# Patient Record
Sex: FEMALE | Race: WHITE | NOT HISPANIC OR LATINO | Employment: FULL TIME | URBAN - METROPOLITAN AREA
[De-identification: names, ages, dates, MRNs, and addresses within clinical notes are randomized per-mention and may not be internally consistent; named-entity substitution may affect disease eponyms.]

---

## 2017-01-09 ENCOUNTER — ALLSCRIPTS OFFICE VISIT (OUTPATIENT)
Dept: OTHER | Facility: OTHER | Age: 27
End: 2017-01-09

## 2017-03-08 ENCOUNTER — ALLSCRIPTS OFFICE VISIT (OUTPATIENT)
Dept: OTHER | Facility: OTHER | Age: 27
End: 2017-03-08

## 2017-04-01 ENCOUNTER — TRANSCRIBE ORDERS (OUTPATIENT)
Dept: URGENT CARE | Age: 27
End: 2017-04-01

## 2017-04-01 ENCOUNTER — HOSPITAL ENCOUNTER (OUTPATIENT)
Dept: RADIOLOGY | Age: 27
Discharge: HOME | End: 2017-04-01
Payer: OTHER MISCELLANEOUS

## 2017-04-01 ENCOUNTER — APPOINTMENT (OUTPATIENT)
Dept: URGENT CARE | Age: 27
End: 2017-04-01
Payer: OTHER MISCELLANEOUS

## 2017-04-01 ENCOUNTER — HOSPITAL ENCOUNTER (OUTPATIENT)
Dept: RADIOLOGY | Facility: HOSPITAL | Age: 27
Discharge: HOME | End: 2017-04-01
Payer: OTHER MISCELLANEOUS

## 2017-04-01 DIAGNOSIS — R52 PAIN: Primary | ICD-10-CM

## 2017-04-01 DIAGNOSIS — R52 PAIN: ICD-10-CM

## 2017-04-01 PROCEDURE — 99283 EMERGENCY DEPT VISIT LOW MDM: CPT | Performed by: FAMILY MEDICINE

## 2017-04-01 PROCEDURE — G0382 LEV 3 HOSP TYPE B ED VISIT: HCPCS | Performed by: FAMILY MEDICINE

## 2017-04-01 PROCEDURE — 73630 X-RAY EXAM OF FOOT: CPT

## 2017-06-15 ENCOUNTER — ALLSCRIPTS OFFICE VISIT (OUTPATIENT)
Dept: OTHER | Facility: OTHER | Age: 27
End: 2017-06-15

## 2017-06-15 DIAGNOSIS — E55.9 VITAMIN D DEFICIENCY: ICD-10-CM

## 2017-12-06 ENCOUNTER — ALLSCRIPTS OFFICE VISIT (OUTPATIENT)
Dept: OTHER | Facility: OTHER | Age: 27
End: 2017-12-06

## 2017-12-12 ENCOUNTER — ALLSCRIPTS OFFICE VISIT (OUTPATIENT)
Dept: OTHER | Facility: OTHER | Age: 27
End: 2017-12-12

## 2017-12-16 NOTE — PROGRESS NOTES
Assessment  1  Hordeolum externum of left upper eyelid (373 11) (H00 014)   2  Need for influenza vaccination (V04 81) (Z23)    Plan  Hordeolum externum of left upper eyelid    · Neomycin-Polymyxin-Gramicidin 1 [id number]  025 Ophthalmic Solution(Neosporin); INSTILL 1-2 DROPS EVERY 4HOURS INTO AFFECTED EYE(S)  Need for influenza vaccination    · Fluzone Quadrivalent 0 5 ML Intramuscular Suspension Prefilled Syringe    Discussion/Summary    Warm compresses to the siteuse Baby Shampoo to clean the eyeliduse the antibiotic drops as directed for 5-7 daysthrow away old make up and do not use any make up until symptoms resolve  The patient was counseled regarding instructions for management  Chief Complaint  Stye left eye      History of Present Illness  HPI: Patient began with redness and tenderness in left upper eyelid  Hard, tender lump  Began 5 days ago, using OTC drops and warm compresses  Today states hard lump left eyelid and has some itching and the swelling has gone down today  No other c/odenies trauma, thinks that it is from use of old mascara      Review of Systems   Constitutional: No fever, no chills, feels well, no tiredness, no recent weight gain or loss  ENT: as noted in HPI  Cardiovascular: no complaints of slow or fast heart rate, no chest pain, no palpitations, no leg claudication or lower extremity edema  Respiratory: no complaints of shortness of breath, no wheezing, no dyspnea on exertion, no orthopnea or PND  Integumentary: as noted in HPI  Active Problems  1  Acute sinusitis (461 9) (J01 90)   2  ADHD, 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)   3  Anxiety (300 00) (F41 9)   4  Bacterial vaginosis (616 10,041 9) (N76 0,B96 89)   5  Community acquired pneumonia (5) (J18 9)   6  Depression (311) (F32 9)   7  Elevated glucose level (790 29) (R73 09)   8  Fatigue, unspecified type (780 79) (R53 83)   9  Headache (784 0) (R51)   10   Iron deficiency anemia, unspecified iron deficiency anemia type (280 9) (D50 9)   11  Post-concussion headache (339 20) (G44 309)   12  Tinea versicolor (111 0) (B36 0)   13  TMJ arthralgia (524 62) (M26 629)   14  Vitamin D deficiency (268 9) (E55 9)    Past Medical History  1  History of Chest pain on respiration (786 52) (R07 1)   2  History of Denial Of Any Significant Medical History   3  History of Fatigue (780 79) (R53 83)   4  History of acute otitis media (V12 49) (Z86 69)   5  History of candidiasis of mouth (V12 09) (Z86 19)   6  History of pregnancy (V13 29)   7  History of strain (V13 59) (Z87 39)   8  History of Pain in joint of left shoulder (719 41) (M25 512)   9  History of Paronychia of finger, unspecified laterality (681 02) (L03 019)   10  History of Presenile dementia (290 10) (F03 90)   11  History of Sebaceous cyst (706 2) (L72 3)   12  History of Tinea versicolor (111 0) (B36 0)    Family History  Father    1  Family history of Heart Disease   2  Family history of Pre-diabetes  Maternal Great Grandmother    3  Family history of colon cancer (V16 0) (Z80 0)  Family History    4  Family history of Heart Disease   5  Family history of Hypertension (V17 49)    Social History   · Current Smoker (305 1)   · Former smoker (O27 57) (C72 043)    Surgical History  1  History of Surgery Excision Lipoma    Current Meds   1  Adderall 10 MG Oral Tablet; TAKE 1/2 A TABLET TWICE DAILY; Therapy: (Recorded:07Nov2016) to Recorded   2  ALPRAZolam 0 25 MG Oral Tablet; 1 tab BID prn anxiety; Therapy: 54Hdj2665 to (Last Rx:12Apr2016) Ordered   3  Clotrimazole 1 % External Cream; APPLY 2-3 TIMES DAILY TO AFFECTED AREA(S); Therapy: 65IHR5567 to (Last Rx:15Jun2017)  Requested for: 37AZK8126 Ordered   4  Cymbalta 30 MG Oral Capsule Delayed Release Particles; TAKE 1 CAPSULE DAILY; Therapy: 03ITX7724 to (Evaluate:16Nov2016)  Requested for: 69AZX1694; Last Rx:89Njs2879 Ordered    Allergies  1   Kenalog AERS    Vitals   Recorded: [id number] 09:59AM   Temperature 96 6 F, Tympanic   Heart Rate 74   Respiration 14   Systolic 639, LUE, Sitting   Diastolic 72, LUE, Sitting   Height 5 ft 9 7 in   Weight 166 lb 2 oz   BMI Calculated 24 04   BSA Calculated 1 92   Pain Scale 0     Physical Exam   Constitutional  General appearance: No acute distress, well appearing and well nourished  Eyes  Conjunctiva and lids: Abnormal  -- left upper eyelid is pink with minimal swelling, non-tender to toudh, hard lump on lateral aspect  Pupils and irises: Equal, round and reactive to light  Ears, Nose, Mouth, and Throat  External inspection of ears and nose: Normal    Otoscopic examination: Tympanic membranes translucent with normal light reflex  Canals patent without erythema  Nasal mucosa, septum, and turbinates: Normal without edema or erythema  Oropharynx: Normal with no erythema, edema, exudate or lesions  Pulmonary  Auscultation of lungs: Clear to auscultation  Cardiovascular  Auscultation of heart: Normal rate and rhythm, normal S1 and S2, without murmurs  Attending Note  Collaborating Physician Note: Collaborating Note: I supervised the Advanced Practitioner-- and-- I agree with the Advanced Practitioner note  Future Appointments    Date/Time Provider Specialty Site   12/28/2017 02:20 PM JAYNE Vick   [address]     Signatures   Electronically signed by : Armando Del Angel[address]; Dec 13 2017  3:46PM EST                       (Author)    Electronically signed by : Cipriano Mohs, M D ; Dec 15 2017  4:27PM EST                       (Author)

## 2018-01-12 VITALS
HEIGHT: 69 IN | SYSTOLIC BLOOD PRESSURE: 116 MMHG | RESPIRATION RATE: 16 BRPM | WEIGHT: 166.13 LBS | HEART RATE: 80 BPM | DIASTOLIC BLOOD PRESSURE: 70 MMHG | TEMPERATURE: 97.7 F | BODY MASS INDEX: 24.61 KG/M2

## 2018-01-13 NOTE — MISCELLANEOUS
Provider Comments  Provider Comments:   PATIENT NO SHOWED TODAYS APPT      Signatures   Electronically signed by : Alan Bazan, ; Jan 9 2017  2:58PM EST                       (Author)

## 2018-01-14 NOTE — RESULT NOTES
Verified Results  (1) COMPREHENSIVE METABOLIC PANEL 69BYQ2104 54:03NF Ceci Shell   REPORT COMMENT:  FASTING:YES     Test Name Result Flag Reference   GLUCOSE 92 mg/dL  65-99   Fasting reference interval   UREA NITROGEN (BUN) 15 mg/dL  7-25   CREATININE 0 83 mg/dL  0 50-1 10   eGFR NON-AFR   AMERICAN 96 mL/min/1 73m2  > OR = 60   eGFR AFRICAN AMERICAN 111 mL/min/1 73m2  > OR = 60   BUN/CREATININE RATIO   7-03   NOT APPLICABLE (calc)   SODIUM 138 mmol/L  135-146   POTASSIUM 4 6 mmol/L  3 5-5 3   CHLORIDE 104 mmol/L     CARBON DIOXIDE 27 mmol/L  20-31   CALCIUM 9 2 mg/dL  8 6-10 2   PROTEIN, TOTAL 6 6 g/dL  6 1-8 1   ALBUMIN 4 3 g/dL  3 6-5 1   GLOBULIN 2 3 g/dL (calc)  1 9-3 7   ALBUMIN/GLOBULIN RATIO 1 9 (calc)  1 0-2 5   BILIRUBIN, TOTAL 0 3 mg/dL  0 2-1 2   ALKALINE PHOSPHATASE 35 U/L     AST 15 U/L  10-30   ALT 14 U/L  6-29

## 2018-01-15 NOTE — PROGRESS NOTES
Assessment    1  Headache (784 0) (R51)    Discussion/Summary    1  headache : non- specific , possibly related to anxiety  Will give ibuprofen and reassess in a week if symptoms don't improve  The patient was counseled regarding risk factor reductions, impressions  Self Referrals: No      Chief Complaint  headache/ear ache      History of Present Illness  HPI: 3 day hx of bitemporal headache, constant  6/10  Not taken any medication  Pt feels anxious  Thinks she might have ear infection  Pt had similar headache few months ago  Resolved spontaneously      Review of Systems    Constitutional: no fever, not feeling poorly, no chills and not feeling tired  ENT: earache, but no sore throat and no nasal discharge  Cardiovascular: the heart rate was not slow, no chest pain and no palpitations  Respiratory: no shortness of breath, no cough and no wheezing  Gastrointestinal: no abdominal pain, no nausea, no vomiting, no constipation and no diarrhea  Musculoskeletal: no arthralgias and no myalgias  Neurological: headache, but no numbness and no dizziness  ROS reviewed  Active Problems    1  Acute sinusitis (461 9) (J01 90)   2  ADHD, 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)   3  Anxiety (300 00) (F41 9)   4  Bacterial vaginosis (616 10,041 9) (N76 0,B96 89)   5  Community acquired pneumonia (5) (J18 9)   6  Depression (311) (F32 9)   7  Elevated glucose level (790 29) (R73 09)   8  Fatigue, unspecified type (780 79) (R53 83)   9  Iron deficiency anemia, unspecified iron deficiency anemia type (280 9) (D50 9)   10  Post-concussion headache (339 20) (G44 309)   11  Tinea versicolor (111 0) (B36 0)   12  TMJ arthralgia (524 62) (M26 629)   13  Vitamin D deficiency (268 9) (E55 9)    Past Medical History    1  History of Chest pain on respiration (786 52) (R07 1)   2  History of Denial Of Any Significant Medical History   3  History of Fatigue (780 79) (R53 83)   4   History of acute otitis media (V12 49) (Z86 69)   5  History of candidiasis of mouth (V12 09) (Z86 19)   6  History of pregnancy (V13 29)   7  History of strain (V13 59) (Z87 39)   8  History of Pain in joint of left shoulder (719 41) (M25 512)   9  History of Paronychia of finger, unspecified laterality (681 02) (L03 019)   10  History of Presenile dementia (290 10) (F03 90)   11  History of Sebaceous cyst (706 2) (L72 3)   12  History of Tinea versicolor (111 0) (B36 0)  Active Problems And Past Medical History Reviewed: The active problems and past medical history were reviewed and updated today  Family History  Father    1  Family history of Heart Disease   2  Family history of Pre-diabetes  Maternal Great Grandmother    3  Family history of colon cancer (V16 0) (Z80 0)  Family History    4  Family history of Heart Disease   5  Family history of Hypertension (V17 49)  Family History Reviewed: The family history was reviewed and updated today  Social History    · Current Smoker (305 1)   · Former smoker (D23 52) (V74 009)  The social history was reviewed and updated today  Surgical History    1  History of Surgery Excision Lipoma  Surgical History Reviewed: The surgical history was reviewed and updated today  Current Meds   1  Adderall 10 MG Oral Tablet; TAKE 1/2 A TABLET TWICE DAILY; Therapy: (Recorded:07Nov2016) to Recorded   2  ALPRAZolam 0 25 MG Oral Tablet; 1 tab BID prn anxiety; Therapy: 30Apr2013 to (Last Rx:12Apr2016) Ordered   3  Azithromycin 250 MG Oral Tablet; TAKE 2 TABLETS ON DAY 1 THEN TAKE 1 TABLET A   DAY FOR 5 DAYS; Therapy: 98Tjc6214 to (Last Rx:78Cuh0757)  Requested for: 01Fpj8600 Ordered   4  Cymbalta 30 MG Oral Capsule Delayed Release Particles; TAKE 1 CAPSULE DAILY; Therapy: 24EGO3938 to (Evaluate:16Nov2016)  Requested for: 60ZKZ6272; Last   Rx:54Nai5012 Ordered   5  Fluconazole 150 MG Oral Tablet; TAKE 2 TABLETS BY MOUTH ONCE A WEEK FOR 2   WEEKS;    Therapy: 00HHZ0262 to (Evaluate:94Zmp5119)  Requested for: 00LYU4383; Last   Rx:74Tvg3678 Ordered   6  MetroNIDAZOLE 0 75 % Vaginal Gel; INSERT 1 APPLICATORFUL INTRAVAGINALLY   TWICE DAILY; Therapy: 63Jif8390 to (Evaluate:29Qws1654)  Requested for: 67Mgm0335; Last   Rx:37Gbe3298 Ordered   7  Tylenol Extra Strength 500 MG Oral Tablet; TAKE 1 TABLET Every 6 hours; Therapy: 17YKZ3874 to (Evaluate:51Rvl1742)  Requested for: 07JOO7064; Last   Rx:07Nov2016 Ordered   8  Vitamin D (Ergocalciferol) 34706 UNIT Oral Capsule; TAKE 1 CAPSULE WEEKLY; Therapy: 63QQL7239 to (Last Rx:14Oct2016)  Requested for: 14Oct2016 Ordered    Allergies    1  Kenalog AERS    Vitals   Recorded: 37STZ5489 04:34PM   Temperature 99 2 F   Heart Rate 76   Respiration 18   Systolic 540   Diastolic 70   Height 5 ft 9 in   Weight 164 lb    BMI Calculated 24 22   BSA Calculated 1 9     Physical Exam    Constitutional   General appearance: No acute distress, well appearing and well nourished  Eyes   Conjunctiva and lids: No swelling, erythema or discharge  Pupils and irises: Equal, round and reactive to light  Ears, Nose, Mouth, and Throat   External inspection of ears and nose: Normal     Otoscopic examination: Tympanic membranes translucent with normal light reflex  Canals patent without erythema  Nasal mucosa, septum, and turbinates: Normal without edema or erythema  Oropharynx: Normal with no erythema, edema, exudate or lesions  Pulmonary   Respiratory effort: No increased work of breathing or signs of respiratory distress  Auscultation of lungs: Clear to auscultation  Cardiovascular   Auscultation of heart: Normal rate and rhythm, normal S1 and S2, without murmurs  Examination of extremities for edema and/or varicosities: Normal     Abdomen   Abdomen: Non-tender, no masses  Lymphatic   Palpation of lymph nodes in neck: No lymphadenopathy      Musculoskeletal   Gait and station: Normal     Digits and nails: Normal without clubbing or cyanosis  Inspection/palpation of joints, bones, and muscles: Normal     Skin   Skin and subcutaneous tissue: Normal without rashes or lesions  Neurologic   Cranial nerves: Cranial nerves 2-12 intact  Psychiatric   Orientation to person, place, and time: Normal     Mood and affect: Normal          Attending Note  Attending Note [facility]: Level of Participation: I was present in clinic, but did not examine the patient  Diagnosis and Plan: HA without red flag symptoms  NSAID, supportive care, return precautions, followup as above  I agree with the Resident's note  Future Appointments    Date/Time Provider Specialty Site   06/12/2017 05:00 PM Trevin Farfan DO Family [facility] [address]     Signatures   Electronically signed by : JAYNE Gill ; Mar  8 2017  4:44PM EST                       (Author)    Electronically signed by :  Sarah Maurice MD; Mar 10 2017  8:30AM EST                       (Author)

## 2018-01-22 VITALS
RESPIRATION RATE: 18 BRPM | WEIGHT: 164 LBS | HEIGHT: 69 IN | BODY MASS INDEX: 24.29 KG/M2 | HEART RATE: 76 BPM | TEMPERATURE: 99.2 F | SYSTOLIC BLOOD PRESSURE: 122 MMHG | DIASTOLIC BLOOD PRESSURE: 70 MMHG

## 2018-01-23 VITALS
HEIGHT: 70 IN | SYSTOLIC BLOOD PRESSURE: 112 MMHG | WEIGHT: 166.13 LBS | HEART RATE: 74 BPM | DIASTOLIC BLOOD PRESSURE: 72 MMHG | BODY MASS INDEX: 23.78 KG/M2 | RESPIRATION RATE: 14 BRPM | TEMPERATURE: 96.6 F

## 2018-01-23 VITALS
SYSTOLIC BLOOD PRESSURE: 106 MMHG | TEMPERATURE: 99.7 F | WEIGHT: 168 LBS | DIASTOLIC BLOOD PRESSURE: 70 MMHG | RESPIRATION RATE: 16 BRPM | HEART RATE: 80 BPM | HEIGHT: 70 IN | BODY MASS INDEX: 24.05 KG/M2

## 2018-01-23 NOTE — MISCELLANEOUS
Message  Return to work or school:   Ghulam Chance is under my professional care  She was seen in my office on 12/12/2017       [facility] MD / LDO          Signatures   Electronically signed by : JAYNE Gamez ; Dec 12 2017  2:51PM EST                       (Author)

## 2018-01-25 ENCOUNTER — OFFICE VISIT (OUTPATIENT)
Dept: FAMILY MEDICINE CLINIC | Facility: CLINIC | Age: 28
End: 2018-01-25
Payer: COMMERCIAL

## 2018-01-25 VITALS
DIASTOLIC BLOOD PRESSURE: 70 MMHG | HEART RATE: 80 BPM | SYSTOLIC BLOOD PRESSURE: 110 MMHG | WEIGHT: 171.2 LBS | TEMPERATURE: 96.7 F | BODY MASS INDEX: 25.36 KG/M2 | RESPIRATION RATE: 16 BRPM | HEIGHT: 69 IN

## 2018-01-25 DIAGNOSIS — B35.1 ONYCHOMYCOSIS: ICD-10-CM

## 2018-01-25 DIAGNOSIS — F90.9 ATTENTION DEFICIT HYPERACTIVITY DISORDER (ADHD), UNSPECIFIED ADHD TYPE: Chronic | ICD-10-CM

## 2018-01-25 DIAGNOSIS — Z00.00 HEALTHCARE MAINTENANCE: Primary | ICD-10-CM

## 2018-01-25 DIAGNOSIS — F41.9 ANXIETY: Chronic | ICD-10-CM

## 2018-01-25 PROCEDURE — 99395 PREV VISIT EST AGE 18-39: CPT | Performed by: FAMILY MEDICINE

## 2018-01-25 RX ORDER — ALPRAZOLAM 0.25 MG/1
TABLET ORAL
COMMUNITY
Start: 2013-04-30

## 2018-01-25 RX ORDER — FLUOXETINE 10 MG/1
TABLET, FILM COATED ORAL
Refills: 0 | COMMUNITY
Start: 2018-01-03

## 2018-01-25 RX ORDER — DEXTROAMPHETAMINE SACCHARATE, AMPHETAMINE ASPARTATE, DEXTROAMPHETAMINE SULFATE AND AMPHETAMINE SULFATE 2.5; 2.5; 2.5; 2.5 MG/1; MG/1; MG/1; MG/1
TABLET ORAL
COMMUNITY

## 2018-01-25 RX ORDER — DULOXETIN HYDROCHLORIDE 30 MG/1
1 CAPSULE, DELAYED RELEASE ORAL DAILY
COMMUNITY
Start: 2014-01-15

## 2018-01-25 NOTE — PATIENT INSTRUCTIONS
1  Health maintenance:  -follow up for pap test within next 2-3 months    Wellness Visit for Adults   AMBULATORY CARE:   A wellness visit  is when you see your healthcare provider to get screened for health problems  You can also get advice on how to stay healthy  Write down your questions so you remember to ask them  Ask your healthcare provider how often you should have a wellness visit  What happens at a wellness visit:  Your healthcare provider will ask about your health, and your family history of health problems  This includes high blood pressure, heart disease, and cancer  He or she will ask if you have symptoms that concern you, if you smoke, and about your mood  You may also be asked about your intake of medicines, supplements, food, and alcohol  Any of the following may be done:  · Your weight  will be checked  Your height may also be checked so your body mass index (BMI) can be calculated  Your BMI shows if you are at a healthy weight  · Your blood pressure  and heart rate will be checked  Your temperature may also be checked  · Blood and urine tests  may be done  Blood tests may be done to check your cholesterol levels  Abnormal cholesterol levels increase your risk for heart disease and stroke  You may also need a blood or urine test to check for diabetes if you are at increased risk  Urine tests may be done to look for signs of an infection or kidney disease  · A physical exam  includes checking your heartbeat and lungs with a stethoscope  Your healthcare provider may also check your skin to look for sun damage  · Screening tests  may be recommended  A screening test is done to check for diseases that may not cause symptoms  The screening tests you may need depend on your age, gender, family history, and lifestyle habits  For example, colorectal screening may be recommended if you are 48years old or older    Screening tests you need if you are a woman:   · A Pap smear  is used to screen for cervical cancer  Pap smears are usually done every 3 to 5 years depending on your age  You may need them more often if you have had abnormal Pap smear test results in the past  Ask your healthcare provider how often you should have a Pap smear  · A mammogram  is an x-ray of your breasts to screen for breast cancer  Experts recommend mammograms every 2 years starting at age 48 years  You may need a mammogram at age 52 years or younger if you have an increased risk for breast cancer  Talk to your healthcare provider about when you should start having mammograms and how often you need them  Vaccines you may need:   · Get an influenza vaccine  every year  The influenza vaccine protects you from the flu  Several types of viruses cause the flu  The viruses change over time, so new vaccines are made each year  · Get a tetanus-diphtheria (Td) booster vaccine  every 10 years  This vaccine protects you against tetanus and diphtheria  Tetanus is a severe infection that may cause painful muscle spasms and lockjaw  Diphtheria is a severe bacterial infection that causes a thick covering in the back of your mouth and throat  · Get a human papillomavirus (HPV) vaccine  if you are female and aged 23 to 32 or male 23 to 24 and never received it  This vaccine protects you from HPV infection  HPV is the most common infection spread by sexual contact  HPV may also cause vaginal, penile, and anal cancers  · Get a pneumococcal vaccine  if you are aged 72 years or older  The pneumococcal vaccine is an injection given to protect you from pneumococcal disease  Pneumococcal disease is an infection caused by pneumococcal bacteria  The infection may cause pneumonia, meningitis, or an ear infection  · Get a shingles vaccine  if you are aged 61 or older, even if you have had shingles before  The shingles vaccine is an injection to protect you from the varicella-zoster virus   This is the same virus that causes chickenpox  Shingles is a painful rash that develops in people who had chickenpox or have been exposed to the virus  How to eat healthy:  My Plate is a model for planning healthy meals  It shows the types and amounts of foods that should go on your plate  Fruits and vegetables make up about half of your plate, and grains and protein make up the other half  A serving of dairy is included on the side of your plate  The amount of calories and serving sizes you need depends on your age, gender, weight, and height  Examples of healthy foods are listed below:  · Eat a variety of vegetables  such as dark green, red, and orange vegetables  You can also include canned vegetables low in sodium (salt) and frozen vegetables without added butter or sauces  · Eat a variety of fresh fruits , canned fruit in 100% juice, frozen fruit, and dried fruit  · Include whole grains  At least half of the grains you eat should be whole grains  Examples include whole-wheat bread, wheat pasta, brown rice, and whole-grain cereals such as oatmeal     · Eat a variety of protein foods such as seafood (fish and shellfish), lean meat, and poultry without skin (turkey and chicken)  Examples of lean meats include pork leg, shoulder, or tenderloin, and beef round, sirloin, tenderloin, and extra lean ground beef  Other protein foods include eggs and egg substitutes, beans, peas, soy products, nuts, and seeds  · Choose low-fat dairy products such as skim or 1% milk or low-fat yogurt, cheese, and cottage cheese  · Limit unhealthy fats  such as butter, hard margarine, and shortening  Exercise:  Exercise at least 30 minutes per day on most days of the week  Some examples of exercise include walking, biking, dancing, and swimming  You can also fit in more physical activity by taking the stairs instead of the elevator or parking farther away from stores  Include muscle strengthening activities 2 days each week   Regular exercise provides many health benefits  It helps you manage your weight, and decreases your risk for type 2 diabetes, heart disease, stroke, and high blood pressure  Exercise can also help improve your mood  Ask your healthcare provider about the best exercise plan for you  General health and safety guidelines:   · Do not smoke  Nicotine and other chemicals in cigarettes and cigars can cause lung damage  Ask your healthcare provider for information if you currently smoke and need help to quit  E-cigarettes or smokeless tobacco still contain nicotine  Talk to your healthcare provider before you use these products  · Limit alcohol  A drink of alcohol is 12 ounces of beer, 5 ounces of wine, or 1½ ounces of liquor  · Lose weight, if needed  Being overweight increases your risk of certain health conditions  These include heart disease, high blood pressure, type 2 diabetes, and certain types of cancer  · Protect your skin  Do not sunbathe or use tanning beds  Use sunscreen with a SPF 15 or higher  Apply sunscreen at least 15 minutes before you go outside  Reapply sunscreen every 2 hours  Wear protective clothing, hats, and sunglasses when you are outside  · Drive safely  Always wear your seatbelt  Make sure everyone in your car wears a seatbelt  A seatbelt can save your life if you are in an accident  Do not use your cell phone when you are driving  This could distract you and cause an accident  Pull over if you need to make a call or send a text message  · Practice safe sex  Use latex condoms if are sexually active and have more than one partner  Your healthcare provider may recommend screening tests for sexually transmitted infections (STIs)  · Wear helmets, lifejackets, and protective gear  Always wear a helmet when you ride a bike or motorcycle, go skiing, or play sports that could cause a head injury  Wear protective equipment when you play sports   Wear a lifejacket when you are on a boat or doing water sports  © 2017 [facility] Information is for End User's use only and may not be sold, redistributed or otherwise used for commercial purposes  All illustrations and images included in CareNotes® are the copyrighted property of A D A M , Inc  or Edin Wheatley  The above information is an  only  It is not intended as medical advice for individual conditions or treatments  Talk to your doctor, nurse or pharmacist before following any medical regimen to see if it is safe and effective for you

## 2018-01-25 NOTE — PROGRESS NOTES
Attending Note:  I discussed the case with the resident and reviewed the resident's note  I was present in the clinic and supervised the resident, I agree with the resident management plan as it was presented to me  I interviewed and examined the patient: no   I agree with the resident's documentation

## 2018-01-30 ENCOUNTER — TELEPHONE (OUTPATIENT)
Dept: FAMILY MEDICINE CLINIC | Facility: CLINIC | Age: 28
End: 2018-01-30

## 2018-01-30 ENCOUNTER — OFFICE VISIT (OUTPATIENT)
Dept: FAMILY MEDICINE CLINIC | Facility: CLINIC | Age: 28
End: 2018-01-30
Payer: COMMERCIAL

## 2018-01-30 DIAGNOSIS — Z11.1 SCREENING FOR TUBERCULOSIS: Primary | ICD-10-CM

## 2018-01-30 PROCEDURE — 99211 OFF/OP EST MAY X REQ PHY/QHP: CPT | Performed by: FAMILY MEDICINE

## 2018-01-30 PROCEDURE — 86580 TB INTRADERMAL TEST: CPT

## 2018-01-30 NOTE — TELEPHONE ENCOUNTER
Patient came in today for results of titers she had done at [facility] lab on [address],on 01/25

## 2018-01-31 NOTE — TELEPHONE ENCOUNTER
Called patient with results  Mumps @ [facility] levels are low, Patient coming in Thurs pm for ppd read  Should we administer MMR booster? Labs in your bin! Results in your bin for review

## 2018-02-01 ENCOUNTER — OFFICE VISIT (OUTPATIENT)
Dept: FAMILY MEDICINE CLINIC | Facility: CLINIC | Age: 28
End: 2018-02-01
Payer: COMMERCIAL

## 2018-02-01 DIAGNOSIS — Z23 NEED FOR HEPATITIS B VACCINATION: ICD-10-CM

## 2018-02-01 DIAGNOSIS — Z23 NEED FOR MMR VACCINE: Primary | ICD-10-CM

## 2018-02-01 LAB
INDURATION: NORMAL MM
TB SKIN TEST: NEGATIVE

## 2018-02-01 PROCEDURE — 90471 IMMUNIZATION ADMIN: CPT

## 2018-02-01 PROCEDURE — 90472 IMMUNIZATION ADMIN EACH ADD: CPT

## 2018-02-01 PROCEDURE — 90707 MMR VACCINE SC: CPT

## 2018-02-01 PROCEDURE — 90746 HEPB VACCINE 3 DOSE ADULT IM: CPT

## 2018-02-01 NOTE — PROGRESS NOTES
Pt came in for ppd read today  PPD read negative 0mm  Pt had titers, needed Hep B vaccine and MMR vaccine  Pt to come back in one month for 2nd Hep B and MMR

## 2018-02-01 NOTE — TELEPHONE ENCOUNTER
Thank you for discussing results with patient  She will need both MMR and repeat of Hep B series according to her work papers  Please notify patient and start hep B series when she comes in for her PPD read  Thank you! I've placed her form back in my triage bin for signature of her PPD read

## 2018-03-02 ENCOUNTER — CLINICAL SUPPORT (OUTPATIENT)
Dept: FAMILY MEDICINE CLINIC | Facility: CLINIC | Age: 28
End: 2018-03-02
Payer: COMMERCIAL

## 2018-03-02 DIAGNOSIS — Z23 NEED FOR MMR VACCINE: ICD-10-CM

## 2018-03-02 DIAGNOSIS — Z23 NEED FOR HEPATITIS B VACCINATION: Primary | ICD-10-CM

## 2018-03-02 PROCEDURE — 90472 IMMUNIZATION ADMIN EACH ADD: CPT | Performed by: FAMILY MEDICINE

## 2018-03-02 PROCEDURE — 90746 HEPB VACCINE 3 DOSE ADULT IM: CPT | Performed by: FAMILY MEDICINE

## 2018-03-02 PROCEDURE — 90707 MMR VACCINE SC: CPT | Performed by: FAMILY MEDICINE

## 2018-03-02 PROCEDURE — 90471 IMMUNIZATION ADMIN: CPT | Performed by: FAMILY MEDICINE

## 2018-04-06 ENCOUNTER — APPOINTMENT (OUTPATIENT)
Dept: URGENT CARE | Facility: CLINIC | Age: 28
End: 2018-04-06

## 2018-04-06 DIAGNOSIS — Z02.1 PRE-EMPLOYMENT EXAMINATION: Primary | ICD-10-CM

## 2018-04-06 PROCEDURE — 86787 VARICELLA-ZOSTER ANTIBODY: CPT | Performed by: FAMILY MEDICINE

## 2018-04-06 PROCEDURE — 86480 TB TEST CELL IMMUN MEASURE: CPT | Performed by: FAMILY MEDICINE

## 2018-04-08 LAB
ANNOTATION COMMENT IMP: NORMAL
GAMMA INTERFERON BACKGROUND BLD IA-ACNC: 0.01 IU/ML
M TB IFN-G BLD-IMP: NEGATIVE
M TB IFN-G CD4+ BCKGRND COR BLD-ACNC: 0.03 IU/ML
M TB IFN-G CD4+ T-CELLS BLD-ACNC: 0.04 IU/ML
MITOGEN IGNF BLD-ACNC: 7.05 IU/ML
QUANTIFERON-TB GOLD IN TUBE: NORMAL
SERVICE CMNT-IMP: NORMAL

## 2018-04-10 LAB — VZV IGG SER IA-ACNC: NORMAL

## 2018-05-01 ENCOUNTER — OFFICE VISIT (OUTPATIENT)
Dept: FAMILY MEDICINE CLINIC | Facility: CLINIC | Age: 28
End: 2018-05-01
Payer: COMMERCIAL

## 2018-05-01 VITALS
TEMPERATURE: 98.3 F | WEIGHT: 173.6 LBS | RESPIRATION RATE: 14 BRPM | DIASTOLIC BLOOD PRESSURE: 72 MMHG | HEIGHT: 69 IN | HEART RATE: 78 BPM | BODY MASS INDEX: 25.71 KG/M2 | SYSTOLIC BLOOD PRESSURE: 112 MMHG

## 2018-05-01 DIAGNOSIS — G43.911 INTRACTABLE MIGRAINE WITH STATUS MIGRAINOSUS, UNSPECIFIED MIGRAINE TYPE: Primary | ICD-10-CM

## 2018-05-01 PROCEDURE — 99213 OFFICE O/P EST LOW 20 MIN: CPT | Performed by: FAMILY MEDICINE

## 2018-05-01 PROCEDURE — 3008F BODY MASS INDEX DOCD: CPT | Performed by: FAMILY MEDICINE

## 2018-05-01 PROCEDURE — 96372 THER/PROPH/DIAG INJ SC/IM: CPT | Performed by: FAMILY MEDICINE

## 2018-05-01 RX ORDER — METOCLOPRAMIDE 10 MG/1
10 TABLET ORAL 4 TIMES DAILY
Qty: 40 TABLET | Refills: 0 | Status: SHIPPED | OUTPATIENT
Start: 2018-05-01 | End: 2018-05-01 | Stop reason: SDUPTHER

## 2018-05-01 RX ORDER — METOCLOPRAMIDE 10 MG/1
10 TABLET ORAL EVERY 6 HOURS PRN
Qty: 40 TABLET | Refills: 0 | Status: SHIPPED | OUTPATIENT
Start: 2018-05-01 | End: 2018-05-11

## 2018-05-01 RX ORDER — KETOROLAC TROMETHAMINE 30 MG/ML
30 INJECTION, SOLUTION INTRAMUSCULAR; INTRAVENOUS ONCE
Status: COMPLETED | OUTPATIENT
Start: 2018-05-01 | End: 2018-05-01

## 2018-05-01 RX ORDER — BUTALBITAL, ACETAMINOPHEN AND CAFFEINE 50; 325; 40 MG/1; MG/1; MG/1
1 TABLET ORAL EVERY 4 HOURS PRN
Qty: 30 TABLET | Refills: 0 | Status: SHIPPED | OUTPATIENT
Start: 2018-05-01

## 2018-05-01 RX ADMIN — KETOROLAC TROMETHAMINE 30 MG: 30 INJECTION, SOLUTION INTRAMUSCULAR; INTRAVENOUS at 14:50

## 2018-05-01 NOTE — PROGRESS NOTES
Kee Olmedo 1990 female MRN: [id number]    Family Medicine Acute Visit    ASSESSMENT/PLAN  Problem List Items Addressed This Visit     None      Visit Diagnoses     Intractable migraine with status migrainosus, unspecified migraine type    -  Primary    -Toradol 30mg IM in office  -Fioricet 1 tab Q4hr PRN for HA  -Reglan 10mg Q6hr PRN for nausea, vomiting  -RTC precautions discussed  Encouraged to drink plenty of fluids to avoid dehydration  Relevant Medications    butalbital-acetaminophen-caffeine (FIORICET,ESGIC) -40 mg per tablet    ketorolac (TORADOL) injection 30 mg (Start on 5/1/2018 10:45 AM)    metoclopramide (REGLAN) 10 mg tablet                No future appointments  SUBJECTIVE  CC: Headache      HPI:  Kee Olmedo is a 32 y o  female who presents for acute visit for headache  3 days of constant headache - nausea, vomiting (1-2 times a day), not able to go to work  Headaches first  Quit smoking 1 month ago  Recently has been working out more  No diet changes  H/o of migraines during pregnancy - took Fioricet and helped  Neck soreness, body aches  No fevers but having chills  No URI symptoms  No sick contacts  Naproxen, Ibuprofen, Motrin at home to mild relief  Took home pregnancy test - negative  IUD Mirena; next period due in 2-3 days  Review of Systems   Constitutional: Positive for chills  Negative for fever  HENT: Negative for congestion, postnasal drip, sinus pain and sinus pressure  Respiratory: Negative for cough and shortness of breath  Cardiovascular: Negative for chest pain  Gastrointestinal: Positive for nausea and vomiting  Negative for blood in stool, constipation and diarrhea  Genitourinary: Negative for decreased urine volume  Neurological: Positive for dizziness and headaches         Historical Information   The patient history was reviewed as follows:  Past Medical History:   Diagnosis Date    Candidiasis, mouth 04/23/2015    Chest pain on respiration 10/18/2012    Fatigue 10/01/2013    Paronychia of finger 10/21/2013    Presenile dementia 10/21/2015    Resolved    Sebaceous cyst 03/13/2013    Tinea versicolor 12/30/2015         Past Surgical History:   Procedure Laterality Date    LIPOMA RESECTION       Family History   Problem Relation Age of Onset    Heart disease Father     Other Father      Pre-Diabetes    Hypertension Family     Colon cancer Family      Maternal Great Grandmother      Social History   History   Alcohol Use    Yes     History   Drug Use No     History   Smoking Status    Current Every Day Smoker   Smokeless Tobacco    Never Used     Comment: Former Smoker as per allscripts       Medications:     Current Outpatient Prescriptions:     ALPRAZolam (XANAX) 0 25 mg tablet, Take by mouth, Disp: , Rfl:     amphetamine-dextroamphetamine (ADDERALL, 10MG,) 10 mg tablet, Take by mouth, Disp: , Rfl:     COPPER PO, 1 each by Intrauterine route, Disp: , Rfl:     DULoxetine (CYMBALTA) 30 mg delayed release capsule, Take 1 capsule by mouth daily, Disp: , Rfl:     FLUoxetine (PROzac) 10 MG tablet, take 1 tablet by mouth every morning with food, Disp: , Rfl: 0    metoclopramide (REGLAN) 10 mg tablet, Take 1 tablet (10 mg total) by mouth every 6 (six) hours as needed (nausea, vomiting) for up to 10 days, Disp: 40 tablet, Rfl: 0    butalbital-acetaminophen-caffeine (FIORICET,ESGIC) -40 mg per tablet, Take 1 tablet by mouth every 4 (four) hours as needed for headaches, Disp: 30 tablet, Rfl: 0    Current Facility-Administered Medications:     ketorolac (TORADOL) injection 30 mg, 30 mg, Intramuscular, Once, Ceci Shell DO    Allergies   Allergen Reactions    Metronidazole GI Intolerance     Vomitting    Triamcinolone      Action Taken: Kenalog causes increases anxiety;     Prednisone Hives     anxiety       OBJECTIVE  Vitals:   Vitals:    05/01/18 0953   BP: 112/72   BP Location: Right arm   Patient Position: Sitting Cuff Size: Large   Pulse: 78   Resp: 14   Temp: 98 3 °F (36 8 °C)   TempSrc: Tympanic   Weight: 78 7 kg (173 lb 9 6 oz)   Height: 5' 9 4" (1 763 m)         Physical Exam   Constitutional: She is oriented to person, place, and time  She appears well-developed and well-nourished  Laying down in dark room, visibly uncomfortable but in no acute distress  HENT:   Head: Normocephalic and atraumatic  Cardiovascular: Normal rate and regular rhythm  No murmur heard  Pulmonary/Chest: Effort normal and breath sounds normal  No respiratory distress  She has no wheezes  Neurological: She is alert and oriented to person, place, and time  No cranial nerve deficit  Skin: She is not diaphoretic  Psychiatric: She has a normal mood and affect  Her behavior is normal  Judgment and thought content normal    Nursing note and vitals reviewed                   Genevieve Sever, DO, PGY-3  [facility]   5/1/2018

## 2018-05-01 NOTE — LETTER
May 1, 2018     Patient: Travis Rodriguez   YOB: 1990   Date of Visit: 5/1/2018       To Whom it May Concern:    Madhu Boyer is under my professional care  She was seen in my office on 5/1/2018  She may return to work on 5/2/18  Please excuse patient from work 4/29/18, 4/30/18, and 5/1/18  Thank you       If you have any questions or concerns, please don't hesitate to call           Sincerely,          Ceci Shell DO        CC: No Recipients

## 2018-06-21 ENCOUNTER — TELEPHONE (OUTPATIENT)
Dept: FAMILY MEDICINE CLINIC | Facility: CLINIC | Age: 28
End: 2018-06-21

## 2018-06-21 DIAGNOSIS — Z92.29 HAS RECEIVED THIRD DOSE OF HEPATITIS B VACCINE: Primary | ICD-10-CM

## 2018-06-21 NOTE — TELEPHONE ENCOUNTER
Update: Patient has [facility] [place] forms that are pending  They have been on hold until she completed her Hep B and MMR series, in addition to 2 PPD reads  She has completed her MMR series and has 1 more shot in her Hep B series, which she's coming in for 7/6/18  At this time, she will also need a second PPD insertion  According to her forms, she needs 2 sets of PPD testing within 12 months and last PPD within 3 months of her start date, therefore can get her second PPD insertion at the next visit 7/6/18 and read on Monday 7/9/18  1  Please administer both Hep B #3 and PPD at 7/6/18 visit  2  She will need a hep B surface antibody titer completed 1 month after her series is complete - drawn after 8/8/18  I have ordered it, please give to patient at the nurse visit  3  Please schedule for PPD read on Monday, 7/9/18   4  I have placed the forms in the front  bin  Please complete as appropriate when she receives her last Hep B vaccine and her second PPD read  5   After her second PPD, she may either take the forms to her college and then send updated forms with the follow up titer, or she may have us hold on to them until her 8/8/18 titers are completed  Please discuss with patient and decide as appropriate       Thank you and if you have questions, please let me know!!

## 2019-06-02 ENCOUNTER — AMB VIDEO VISIT (OUTPATIENT)
Dept: URGENT CARE | Facility: CLINIC | Age: 29
End: 2019-06-02

## 2019-06-02 DIAGNOSIS — L24.4 IRRITANT CONTACT DERMATITIS DUE TO DRUG IN CONTACT WITH SKIN: Primary | ICD-10-CM

## 2019-06-02 PROCEDURE — EVISIT: Performed by: PHYSICIAN ASSISTANT

## 2019-06-06 ENCOUNTER — OFFICE VISIT (OUTPATIENT)
Dept: FAMILY MEDICINE CLINIC | Facility: CLINIC | Age: 29
End: 2019-06-06

## 2019-06-06 VITALS
HEART RATE: 80 BPM | HEIGHT: 69 IN | RESPIRATION RATE: 14 BRPM | TEMPERATURE: 98.5 F | DIASTOLIC BLOOD PRESSURE: 74 MMHG | WEIGHT: 192.8 LBS | BODY MASS INDEX: 28.56 KG/M2 | SYSTOLIC BLOOD PRESSURE: 118 MMHG

## 2019-06-06 DIAGNOSIS — L24.4 IRRITANT CONTACT DERMATITIS DUE TO DRUG IN CONTACT WITH SKIN: ICD-10-CM

## 2019-06-06 PROCEDURE — 3008F BODY MASS INDEX DOCD: CPT | Performed by: FAMILY MEDICINE

## 2019-06-06 PROCEDURE — 99213 OFFICE O/P EST LOW 20 MIN: CPT | Performed by: FAMILY MEDICINE

## 2020-08-22 NOTE — MISCELLANEOUS
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)   2  Anxiety (300 00) (F41 9)   3  Depression (311) (F32 9)   4  Elevated glucose level (790 29) (R73 09)   5  Fatigue, unspecified type (780 79) (R53 83)   6  Iron deficiency anemia, unspecified iron deficiency anemia type (280 9) (D50 9)   7  TMJ arthralgia (524 62) (M26 62)   8  Vitamin D deficiency (268 9) (E55 9)    Current Meds   1  ALPRAZolam 0 25 MG Oral Tablet; 1 tab BID prn anxiety; Therapy: [id number] to (Last Rx:[id number]) Ordered   2  Cymbalta 30 MG Oral Capsule Delayed Release Particles (DULoxetine HCl); TAKE 1   CAPSULE DAILY; Therapy: 50IOD8993 to (Evaluate:16Nov2016)  Requested for: [id number]; Last   Rx:[id number] Ordered    Allergies    1   Kenalog AERS    Signatures   Electronically signed by : INGA Ramirez; Sep 27 2016 11:25AM EST                       (Author) acetaminophen 325 mg oral tablet: 2 tab(s) orally every 6 hours, As needed, Mild Pain (1 - 3)  amiodarone 200 mg oral tablet: 1 tab(s) orally once a day  aspirin 81 mg oral delayed release capsule:  orally   atorvastatin 40 mg oral tablet: 1 tab(s) orally once a day (at bedtime)  Eliquis 5 mg oral tablet: 1 tab(s) orally 2 times a day. Restart taking on Saturday, November 3rd.  losartan 25 mg oral tablet: Take .5 tab(s) orally twice daily  spironolactone 25 mg oral tablet: 1 tab(s) orally once a day  Toprol-XL 25 mg oral tablet, extended release: 1 tab(s) orally once a day  torsemide 20 mg oral tablet: 1 tab(s) orally once a day